# Patient Record
Sex: FEMALE | Race: WHITE | Employment: FULL TIME | URBAN - METROPOLITAN AREA
[De-identification: names, ages, dates, MRNs, and addresses within clinical notes are randomized per-mention and may not be internally consistent; named-entity substitution may affect disease eponyms.]

---

## 2019-09-12 ENCOUNTER — HOSPITAL ENCOUNTER (OUTPATIENT)
Age: 49
Setting detail: SPECIMEN
Discharge: HOME | End: 2019-09-12

## 2019-09-12 LAB
ABSOLUTE EOS #: 0.07 K/UL (ref 0–0.44)
ABSOLUTE IMMATURE GRANULOCYTE: <0.03 K/UL (ref 0–0.3)
ABSOLUTE LYMPH #: 1.43 K/UL (ref 1.1–3.7)
ABSOLUTE MONO #: 0.37 K/UL (ref 0.1–1.2)
ALBUMIN SERPL-MCNC: 4.1 G/DL (ref 3.5–5.2)
ALBUMIN/GLOBULIN RATIO: 1.6 (ref 1–2.5)
ALP BLD-CCNC: 31 U/L (ref 35–104)
ALT SERPL-CCNC: 28 U/L (ref 5–33)
ANION GAP SERPL CALCULATED.3IONS-SCNC: 12 MMOL/L (ref 9–17)
AST SERPL-CCNC: 22 U/L
BASOPHILS # BLD: 1 % (ref 0–2)
BASOPHILS ABSOLUTE: 0.04 K/UL (ref 0–0.2)
BILIRUB SERPL-MCNC: 0.37 MG/DL (ref 0.3–1.2)
BUN BLDV-MCNC: 14 MG/DL (ref 6–20)
BUN/CREAT BLD: ABNORMAL (ref 9–20)
CALCIUM SERPL-MCNC: 9.1 MG/DL (ref 8.6–10.4)
CHLORIDE BLD-SCNC: 106 MMOL/L (ref 98–107)
CHOLESTEROL/HDL RATIO: 3.3
CHOLESTEROL: 154 MG/DL
CO2: 24 MMOL/L (ref 20–31)
CREAT SERPL-MCNC: 0.48 MG/DL (ref 0.5–0.9)
DIFFERENTIAL TYPE: NORMAL
EOSINOPHILS RELATIVE PERCENT: 2 % (ref 1–4)
GFR AFRICAN AMERICAN: >60 ML/MIN
GFR NON-AFRICAN AMERICAN: >60 ML/MIN
GFR SERPL CREATININE-BSD FRML MDRD: ABNORMAL ML/MIN/{1.73_M2}
GFR SERPL CREATININE-BSD FRML MDRD: ABNORMAL ML/MIN/{1.73_M2}
GLUCOSE BLD-MCNC: 95 MG/DL (ref 70–99)
HCT VFR BLD CALC: 40.8 % (ref 36.3–47.1)
HDLC SERPL-MCNC: 46 MG/DL
HEMOGLOBIN: 12.7 G/DL (ref 11.9–15.1)
IMMATURE GRANULOCYTES: 0 %
LDL CHOLESTEROL: 92 MG/DL (ref 0–130)
LYMPHOCYTES # BLD: 36 % (ref 24–43)
MCH RBC QN AUTO: 28.9 PG (ref 25.2–33.5)
MCHC RBC AUTO-ENTMCNC: 31.1 G/DL (ref 28.4–34.8)
MCV RBC AUTO: 92.7 FL (ref 82.6–102.9)
MONOCYTES # BLD: 9 % (ref 3–12)
NRBC AUTOMATED: 0 PER 100 WBC
PDW BLD-RTO: 12.3 % (ref 11.8–14.4)
PLATELET # BLD: 270 K/UL (ref 138–453)
PLATELET ESTIMATE: NORMAL
PMV BLD AUTO: 11.3 FL (ref 8.1–13.5)
POTASSIUM SERPL-SCNC: 4.8 MMOL/L (ref 3.7–5.3)
RBC # BLD: 4.4 M/UL (ref 3.95–5.11)
RBC # BLD: NORMAL 10*6/UL
SEG NEUTROPHILS: 52 % (ref 36–65)
SEGMENTED NEUTROPHILS ABSOLUTE COUNT: 2.09 K/UL (ref 1.5–8.1)
SODIUM BLD-SCNC: 142 MMOL/L (ref 135–144)
THYROXINE, FREE: 1.81 NG/DL (ref 0.93–1.7)
TOTAL PROTEIN: 6.6 G/DL (ref 6.4–8.3)
TRIGL SERPL-MCNC: 78 MG/DL
TSH SERPL DL<=0.05 MIU/L-ACNC: 0.07 MIU/L (ref 0.3–5)
VLDLC SERPL CALC-MCNC: NORMAL MG/DL (ref 1–30)
WBC # BLD: 4 K/UL (ref 3.5–11.3)
WBC # BLD: NORMAL 10*3/UL

## 2020-04-14 ENCOUNTER — HOSPITAL ENCOUNTER (OUTPATIENT)
Age: 50
Setting detail: SPECIMEN
Discharge: HOME | End: 2020-04-14
Payer: MEDICAID

## 2020-04-14 LAB
ABSOLUTE EOS #: 0.14 K/UL (ref 0–0.44)
ABSOLUTE IMMATURE GRANULOCYTE: <0.03 K/UL (ref 0–0.3)
ABSOLUTE LYMPH #: 1.64 K/UL (ref 1.1–3.7)
ABSOLUTE MONO #: 0.41 K/UL (ref 0.1–1.2)
BASOPHILS # BLD: 1 % (ref 0–2)
BASOPHILS ABSOLUTE: 0.05 K/UL (ref 0–0.2)
DIFFERENTIAL TYPE: ABNORMAL
EOSINOPHILS RELATIVE PERCENT: 3 % (ref 1–4)
FERRITIN: 26 UG/L (ref 13–150)
HCT VFR BLD CALC: 42.6 % (ref 36.3–47.1)
HEMOGLOBIN: 13.3 G/DL (ref 11.9–15.1)
IMMATURE GRANULOCYTES: 0 %
IRON SATURATION: 19 % (ref 20–55)
IRON: 73 UG/DL (ref 37–145)
LYMPHOCYTES # BLD: 36 % (ref 24–43)
MCH RBC QN AUTO: 28.7 PG (ref 25.2–33.5)
MCHC RBC AUTO-ENTMCNC: 31.2 G/DL (ref 28.4–34.8)
MCV RBC AUTO: 92 FL (ref 82.6–102.9)
MONOCYTES # BLD: 9 % (ref 3–12)
NRBC AUTOMATED: 0 PER 100 WBC
PDW BLD-RTO: 14.8 % (ref 11.8–14.4)
PLATELET # BLD: 278 K/UL (ref 138–453)
PLATELET ESTIMATE: ABNORMAL
PMV BLD AUTO: 10.2 FL (ref 8.1–13.5)
RBC # BLD: 4.63 M/UL (ref 3.95–5.11)
RBC # BLD: ABNORMAL 10*6/UL
SEG NEUTROPHILS: 51 % (ref 36–65)
SEGMENTED NEUTROPHILS ABSOLUTE COUNT: 2.33 K/UL (ref 1.5–8.1)
TOTAL IRON BINDING CAPACITY: 394 UG/DL (ref 250–450)
UNSATURATED IRON BINDING CAPACITY: 321 UG/DL (ref 112–347)
WBC # BLD: 4.6 K/UL (ref 3.5–11.3)
WBC # BLD: ABNORMAL 10*3/UL

## 2021-01-27 ENCOUNTER — HOSPITAL ENCOUNTER (OUTPATIENT)
Age: 51
Setting detail: SPECIMEN
Discharge: HOME | End: 2021-01-27
Payer: COMMERCIAL

## 2021-01-27 LAB
ABSOLUTE EOS #: 0.09 K/UL (ref 0–0.44)
ABSOLUTE IMMATURE GRANULOCYTE: <0.03 K/UL (ref 0–0.3)
ABSOLUTE LYMPH #: 1.69 K/UL (ref 1.1–3.7)
ABSOLUTE MONO #: 0.44 K/UL (ref 0.1–1.2)
ALBUMIN SERPL-MCNC: 4.1 G/DL (ref 3.5–5.2)
ALBUMIN/GLOBULIN RATIO: 1.3 (ref 1–2.5)
ALP BLD-CCNC: 49 U/L (ref 35–104)
ALT SERPL-CCNC: 14 U/L (ref 5–33)
ANION GAP SERPL CALCULATED.3IONS-SCNC: 12 MMOL/L (ref 9–17)
AST SERPL-CCNC: 19 U/L
BASOPHILS # BLD: 1 % (ref 0–2)
BASOPHILS ABSOLUTE: 0.06 K/UL (ref 0–0.2)
BILIRUB SERPL-MCNC: 0.6 MG/DL (ref 0.3–1.2)
BUN BLDV-MCNC: 14 MG/DL (ref 6–20)
BUN/CREAT BLD: ABNORMAL (ref 9–20)
CALCIUM SERPL-MCNC: 8.5 MG/DL (ref 8.6–10.4)
CHLORIDE BLD-SCNC: 103 MMOL/L (ref 98–107)
CHOLESTEROL/HDL RATIO: 2.5
CHOLESTEROL: 229 MG/DL
CO2: 24 MMOL/L (ref 20–31)
CREAT SERPL-MCNC: 0.61 MG/DL (ref 0.5–0.9)
DIFFERENTIAL TYPE: NORMAL
EOSINOPHILS RELATIVE PERCENT: 2 % (ref 1–4)
GFR AFRICAN AMERICAN: >60 ML/MIN
GFR NON-AFRICAN AMERICAN: >60 ML/MIN
GFR SERPL CREATININE-BSD FRML MDRD: ABNORMAL ML/MIN/{1.73_M2}
GFR SERPL CREATININE-BSD FRML MDRD: ABNORMAL ML/MIN/{1.73_M2}
GLUCOSE BLD-MCNC: 85 MG/DL (ref 70–99)
HCT VFR BLD CALC: 39.1 % (ref 36.3–47.1)
HDLC SERPL-MCNC: 90 MG/DL
HEMOGLOBIN: 12.6 G/DL (ref 11.9–15.1)
IMMATURE GRANULOCYTES: 0 %
LDL CHOLESTEROL: 98 MG/DL (ref 0–130)
LYMPHOCYTES # BLD: 31 % (ref 24–43)
MCH RBC QN AUTO: 30.8 PG (ref 25.2–33.5)
MCHC RBC AUTO-ENTMCNC: 32.2 G/DL (ref 28.4–34.8)
MCV RBC AUTO: 95.6 FL (ref 82.6–102.9)
MONOCYTES # BLD: 8 % (ref 3–12)
NRBC AUTOMATED: 0 PER 100 WBC
PDW BLD-RTO: 13.1 % (ref 11.8–14.4)
PLATELET # BLD: 328 K/UL (ref 138–453)
PLATELET ESTIMATE: NORMAL
PMV BLD AUTO: 10.5 FL (ref 8.1–13.5)
POTASSIUM SERPL-SCNC: 4.5 MMOL/L (ref 3.7–5.3)
RBC # BLD: 4.09 M/UL (ref 3.95–5.11)
RBC # BLD: NORMAL 10*6/UL
SEG NEUTROPHILS: 58 % (ref 36–65)
SEGMENTED NEUTROPHILS ABSOLUTE COUNT: 3.16 K/UL (ref 1.5–8.1)
SODIUM BLD-SCNC: 139 MMOL/L (ref 135–144)
THYROXINE, FREE: 1.16 NG/DL (ref 0.93–1.7)
TOTAL PROTEIN: 7.2 G/DL (ref 6.4–8.3)
TRIGL SERPL-MCNC: 206 MG/DL
TSH SERPL DL<=0.05 MIU/L-ACNC: 17.47 MIU/L (ref 0.3–5)
VLDLC SERPL CALC-MCNC: ABNORMAL MG/DL (ref 1–30)
WBC # BLD: 5.5 K/UL (ref 3.5–11.3)
WBC # BLD: NORMAL 10*3/UL

## 2021-03-15 ENCOUNTER — HOSPITAL ENCOUNTER (OUTPATIENT)
Age: 51
Setting detail: SPECIMEN
Discharge: HOME | End: 2021-03-15
Payer: COMMERCIAL

## 2021-03-15 LAB — TSH SERPL DL<=0.05 MIU/L-ACNC: 0.38 MIU/L (ref 0.3–5)

## 2021-07-30 ENCOUNTER — HOSPITAL ENCOUNTER (OUTPATIENT)
Dept: OCCUPATIONAL THERAPY | Age: 51
Setting detail: THERAPIES SERIES
Discharge: HOME | End: 2021-07-30
Payer: COMMERCIAL

## 2021-07-30 PROCEDURE — 97165 OT EVAL LOW COMPLEX 30 MIN: CPT

## 2021-07-30 PROCEDURE — 97110 THERAPEUTIC EXERCISES: CPT

## 2021-07-30 NOTE — PROGRESS NOTES
** PLEASE SIGN, DATE AND TIME CERTIFICATION BELOW AND RETURN TO [facility] OUTPATIENT REHABILITATION (FAX #: [phone]). ATTEST/CO-SIGN IF ACCESSING VIA [employer]. THANK YOU.**    I certify that I have examined the patient below and determined that Physical Medicine and Rehabilitation service is necessary and that I approve the established plan of care for up to 90 days or as specifically noted. Attestation, signature or co-signature of physician indicates approval of certification requirements.    ________________________ ____________ __________  Physician Signature   Date   Time   [address] THER[address]  [x] EVALUATION  [] DAILY NOTE (LAND) [] DAILY NOTE (AQUATIC ) [] PROGRESS NOTE [] DISCHARGE NOTE    [x] [address] [facility]   [] [facility]    [] [facility]   [] Suzanne Boston    Date: 2021  Patient Name:  Eva Selby  : 1970  MRN: [id number]  CSN: [id number]    Referring Practitioner Christopher Salinas MD   Diagnosis RIGHT SHOULDER PAIN     Treatment Diagnosis Impingement syndrome R shoulder   Date of Evaluation 21      Functional Outcome Measure Used Upper Extremity Functional Scale   Functional Outcome Score 36/80 (21)       Insurance: Primary: Payor: [address] [place] [address] [address] /  /  / ,   Secondary:    Authorization Information: PRECERTIFICATION REQUIRED: No  INSURANCE THERAPY BENEFIT: No pre-certification is needed. PT, OT and ST are allowed 30 visits each per calendar year. AQUATIC THERAPY COVERED:  Yes   MODALITIES COVERED:  Yes--Iontophoresis is not covered. Hot/Cold Packs are not covered. TELEHEALTH COVERED:  Yes   Visit # 1, 1/10 for progress note   Visits Allowed: 30   Recertification Date:    Physician Follow-Up:    Physician Orders: 1-2x/wk x 4 weeks focusing on a HEP   Pertinent History: Patient started having pain in , perhaps end of .   Unknown cause of onset of pain. Patient had X-rays done, referred for therapy. Patient is also scheduled for an EMG early next month for suspected carpal tunnel. SUBJECTIVE: Patient pleasant and cooperative. Social/Functional History:  Medications and Allergies have been reviewed and are listed on the Medical History Questionnaire      Task Prior Level of Function  (current level of function addressed below)   ADLs  Independent   Ambulation Independent   Transfers Independent   [employer], amusement loera, going to the [employer]   Work [employer]. Occupation: owns a restaurant     OBJECTIVE:  Hand Dominance right handed   Palpation Tender to palpation R upper trap   Observation R scapula depressed and abducted, forward shoulder   Posture See above   Edema    Special Tests        ADL's Patient reports difficulty with lifting, grooming, putting on a bra       Sensation Right Upper Extremity: Diminished. Patient reports numbness and tingling in median n. Distr. Of R hand. Is wearing wrist splint at night which helps. Coordination WFL            RIGHT UPPER EXTREMITY  RANGE OF MOTION    AROM PROM COMMENTS         Shoulder Flexion 85 150    Shoulder Extension      Shoulder Abduction 65     Shoulder Adduction      Shoulder External Rotation 20 70     Shoulder Internal Rotation Thumb tip reaches waistline. 65    Shoulder Range of Motion is [facility] [place]  []      Elbow Flexion      Elbow Extension      Forearm Pronation      Forearm Supination      Elbow Range of Motion is [facility] [facility] [place]  [x]      Wrist Flexion      Wrist Extension      Wrist Radial Deviation      Wrist Ulnar Deviation      Wrist Range of Motion is [facility]ON[facility] [facility] [place]  [x]   If no measurement is recorded, no formal assessment was completed for that motion.        RIGHT UPPER EXTREMITY  STRENGTH    Strength Rating Comments   Shoulder Flexion 2+/5    Shoulder Extension     Shoulder Abduction 2+/5    Shoulder Adduction     Shoulder External Rotation 2+/5    Shoulder Internal overhead to be able to put up her hair. * Patient will improve AROM R shoulder flexion to 120, abduction to 120, ER to 50, IR thumb tip to 2\" above waistline for improved ease with dressing and grooming. * Patient will report pain no more than 1/10 at the end of her work shift. * Patient will improve R shoulder flexion, abduction, ER strength to 3+/5 to be able to stir pots at work. Long Term Goals:  Time Frame: 8 weeks  *  Patient will improve UEFS to at least 50. * Patient will demonstrate ability to reach overhead for an object with affected extremity without increased pain. Patient Education:   [x]  HEP/Education Completed: Plan of Care, Goals, HEP   [employer] Access Code for HEP: Access Code: [email or web address]   Sleeper Stretch - 3 x daily - 7 x weekly - 1 sets - 3-5 reps - 30 hold   Standing shoulder flexion wall slides - 2 x daily - 7 x weekly - 1 sets - 10 reps - 10 hold   Corner Pec Minor Stretch - 2 x daily - 7 x weekly - 1 sets - 3-5 reps - 30 hold   Standing Shoulder Internal Rotation Stretch with Hands Behind Back - 2 x daily - 7 x weekly - 1 sets - 3-5 reps - 30 hold   Gentle Levator Scapulae Stretch - 1 x daily - 7 x weekly - 3 sets - 10 reps   Gentle Levator Scapulae Stretch - 2 x daily - 7 x weekly - 1 sets - 3-5 reps - 30 hold  []  No new Education completed  []  Reviewed Prior HEP      [x]  Patient verbalized and/or demonstrated understanding of education provided. []  Patient unable to verbalize and/or demonstrate understanding of education provided. Will continue education. [x]  Barriers to learning: none    PLAN:  Treatment Recommendations: Strengthening, Range of Motion, Manual Therapy - Soft Tissue Mobilization, Manual Therapy - Joint Manipulation, Pain Management, Home Exercise Program and Patient Education    [x]  Plan of care initiated. Plan to see patient 2 times per week for 8 weeks to address the treatment planned outlined above.   []  Continue with current plan of care  [] Modify plan of care as follows:    []  Hold pending physician visit  []  Discharge    Time In 0800   Time Out 0900   Timed Code Minutes: 15 min   Total Treatment Time: 60 min       Electronically Signed by:  Jay MASSEY, T #[id number]

## 2021-08-06 ENCOUNTER — HOSPITAL ENCOUNTER (OUTPATIENT)
Dept: OCCUPATIONAL THERAPY | Age: 51
Setting detail: THERAPIES SERIES
Discharge: HOME | End: 2021-08-06
Payer: COMMERCIAL

## 2021-08-06 PROCEDURE — 97140 MANUAL THERAPY 1/> REGIONS: CPT

## 2021-08-06 PROCEDURE — 97110 THERAPEUTIC EXERCISES: CPT

## 2021-08-06 NOTE — PROGRESS NOTES
** PLEASE SIGN, DATE AND TIME CERTIFICATION BELOW AND RETURN TO [facility] OUTPATIENT REHABILITATION (FAX #: [phone]). ATTEST/CO-SIGN IF ACCESSING VIA [employer]. THANK YOU.**    I certify that I have examined the patient below and determined that Physical Medicine and Rehabilitation service is necessary and that I approve the established plan of care for up to 90 days or as specifically noted. Attestation, signature or co-signature of physician indicates approval of certification requirements.    ________________________ ____________ __________  Physician Signature   Date   Time   [address]  [] EVALUATION  [x] DAILY NOTE (LAND) [] DAILY NOTE (AQUATIC ) [] PROGRESS NOTE [] DISCHARGE NOTE    [x] [address] [facility]   [] [place] [address]    [] [address] [facility]   [] Mar Hy    Date: 2021  Patient Name:  Iris Vyas  : 1970  MRN: [id number]  CSN: [id number]    Referring Practitioner Kateri Gitelman, MD   Diagnosis RIGHT SHOULDER PAIN     Treatment Diagnosis Impingement syndrome R shoulder   Date of Evaluation 21      Functional Outcome Measure Used Upper Extremity Functional Scale   Functional Outcome Score 36/80 (21)       Insurance: Primary: Payor: [address] [place] [address] [address] /  /  / ,   Secondary:    Authorization Information: PRECERTIFICATION REQUIRED: No  INSURANCE THERAPY BENEFIT: No pre-certification is needed. PT, OT and ST are allowed 30 visits each per calendar year. AQUATIC THERAPY COVERED:  Yes   MODALITIES COVERED:  Yes--Iontophoresis is not covered. Hot/Cold Packs are not covered. TELEHEALTH COVERED:  Yes   Visit # 2, 2/10 for progress note   Visits Allowed: 30   Recertification Date:    Physician Follow-Up:    Physician Orders: 1-2x/wk x 4 weeks focusing on a HEP   Pertinent History: Patient started having pain in , perhaps end of .   Unknown cause of Patient will improve AROM R shoulder flexion to 120, abduction to 120, ER to 50, IR thumb tip to 2\" above waistline for improved ease with dressing and grooming. * Patient will report pain no more than 1/10 at the end of her work shift. * Patient will improve R shoulder flexion, abduction, ER strength to 3+/5 to be able to stir pots at work. Long Term Goals:  Time Frame: 8 weeks  *  Patient will improve UEFS to at least 50. * Patient will demonstrate ability to reach overhead for an object with affected extremity without increased pain. Patient Education:   [x]  HEP/Education Completed: Plan of Care, Goals, HEP   [address] Access Code for HEP: Access Code: [email or web address]   Sleeper Stretch - 3 x daily - 7 x weekly - 1 sets - 3-5 reps - 30 hold   Standing shoulder flexion wall slides - 2 x daily - 7 x weekly - 1 sets - 10 reps - 10 hold   Corner Pec Minor Stretch - 2 x daily - 7 x weekly - 1 sets - 3-5 reps - 30 hold   Standing Shoulder Internal Rotation Stretch with Hands Behind Back - 2 x daily - 7 x weekly - 1 sets - 3-5 reps - 30 hold   Gentle Levator Scapulae Stretch - 1 x daily - 7 x weekly - 3 sets - 10 reps   Shoulder External Rotation with Anchored Resistance - 1 x daily - 7 x weekly - 10 reps - 3 sets - NEW TODAY   Shoulder Extension with Resistance - 1 x daily - 7 x weekly - 10 reps - 3 sets - NEW TODAY   Shoulder Internal Rotation with Resistance - 1 x daily - 7 x weekly - 10 reps - 3 sets - NEW TODAY   Standing Single Arm Shoulder Flexion with Posterior Anchored Resistance - 1 x daily - 7 x weekly - 10 reps - 3 sets - NEW TODAY   Supine Scapular Protraction in Flexion with Dumbbells - 1 x daily - 7 x weekly - 3 sets - 10 reps - NEW TODAY  []  No new Education completed  []  Reviewed Prior HEP      [x]  Patient verbalized and/or demonstrated understanding of education provided. []  Patient unable to verbalize and/or demonstrate understanding of education provided.   Will continue education. [x]  Barriers to learning: none    PLAN:  Treatment Recommendations: Strengthening, Range of Motion, Manual Therapy - Soft Tissue Mobilization, Manual Therapy - Joint Manipulation, Pain Management, Home Exercise Program and Patient Education    []  Plan of care initiated. Plan to see patient 2 times per week for 8 weeks to address the treatment planned outlined above.   [x]  Continue with current plan of care  []  Modify plan of care as follows:    []  Hold pending physician visit  []  Discharge    Time In 0900   Time Out 0938   Timed Code Minutes: 38 min   Total Treatment Time: 38 min       Electronically Signed by:  Efrain MASSEY, CHT #[id number]

## 2021-08-09 ENCOUNTER — HOSPITAL ENCOUNTER (OUTPATIENT)
Dept: OCCUPATIONAL THERAPY | Age: 51
Setting detail: THERAPIES SERIES
Discharge: HOME | End: 2021-08-09
Payer: COMMERCIAL

## 2021-08-09 PROCEDURE — 97110 THERAPEUTIC EXERCISES: CPT

## 2021-08-09 NOTE — PROGRESS NOTES
[address]  [] EVALUATION  [x] DAILY NOTE (LAND) [] DAILY NOTE (AQUATIC ) [] PROGRESS NOTE [] DISCHARGE NOTE    [x] OUTPATIENT REHABILITATION CENTER [facility]   [] [place] [address]    [] [facility]   [] Noah Linear    Date: 2021  Patient Name:  Pee Demarco  : 1970  MRN: [id number]  CSN: [id number]    Referring Practitioner Wero Farris MD   Diagnosis RIGHT SHOULDER PAIN     Treatment Diagnosis Impingement syndrome R shoulder   Date of Evaluation 21      Functional Outcome Measure Used Upper Extremity Functional Scale   Functional Outcome Score 36/80 (21)       Insurance: Primary: Payor: [address] [place] [address]  [address] /  /  / ,   Secondary:    Authorization Information: PRECERTIFICATION REQUIRED: No  INSURANCE THERAPY BENEFIT: No pre-certification is needed. PT, OT and ST are allowed 30 visits each per calendar year. AQUATIC THERAPY COVERED:  Yes   MODALITIES COVERED:  Yes--Iontophoresis is not covered. Hot/Cold Packs are not covered. TELEHEALTH COVERED:  Yes   Visit # 3, 310 for progress note   Visits Allowed: 30   Recertification Date:    Physician Follow-Up:    Physician Orders: 1-2x/wk x 4 weeks focusing on a HEP   Pertinent History: Patient started having pain in , perhaps end of . Unknown cause of onset of pain. Patient had X-rays done, referred for therapy. Patient is also scheduled for an EMG early next month for suspected carpal tunnel. SUBJECTIVE: Patient reports she thought the kinesiotaping helped a bit. Doing the exercises as much as she can.     OBJECTIVE:    TREATMENT   Precautions: General precautions    Pain: 1/10 R upper trap     X in shaded column indicates Activity Completed Today   Modalities Parameters/  Location  Notes/Comments                     Manual Therapy Time/  Technique  Notes/Comments   IASTM  X Completed to R shoulder especially to tight areas at upper trap and around scapula as well as pec minor               Exercises   Sets/  Sec Reps  Notes/Comments   Sleeper stretch 30 sec 2 X    Wall slides 10 sec 5 X    pec minor stretch in corner 30 sec 2 X    IR stretch  30 sec 2 X Added levator stretch into exercise   Theraband IR/ER/flex/ext 1 10 ea X IR/ER at 0 with towel roll, flexion kept below 90. Supine scapular punch 1#, circles CW, CCW 1 15 X Good tolerance    Peach theraband for rows with elbow at 90 degrees and straight 1 10 x                         Activities Time    Notes/Comments   kinesiotaping for pain control   Deltoid and supraspinatus inhibition, mechanical correction anterior to posterior shoulder anchoring at inferior angle of scapula                 Specific Interventions Next Treatment: STM, ROM, scapular/RC strengthening    Activity/Treatment Tolerance:  [x]  Patient tolerated treatment well  []  Patient limited by fatigue  []  Patient limited by pain   []  Patient limited by other medical complications  []  Other:     Assessment: Progressing toward goals. AROM is improved this date, tolerated light strengthening well. Areas for Improvement: impaired activity tolerance, impaired ROM, impaired strength and pain  Prognosis: good    GOALS:  Patient Goal: Reduce pain with activity. Short Term Goals:  Time Frame: 4 weeks  *  Patient will demonstrate I knowledge of HEP for improved flexibliity with reaching overhead to be able to put up her hair. * Patient will improve AROM R shoulder flexion to 120, abduction to 120, ER to 50, IR thumb tip to 2\" above waistline for improved ease with dressing and grooming. * Patient will report pain no more than 1/10 at the end of her work shift. * Patient will improve R shoulder flexion, abduction, ER strength to 3+/5 to be able to stir pots at work. Long Term Goals:  Time Frame: 8 weeks  *  Patient will improve UEFS to at least 50.   * Patient will demonstrate ability to reach overhead for an object with affected extremity without increased pain. Patient Education:   [x]  HEP/Education Completed: Plan of Care, Goals, HEP   [address] Access Code for HEP: Access Code: [email or web address]   Sleeper Stretch - 3 x daily - 7 x weekly - 1 sets - 3-5 reps - 30 hold   Standing shoulder flexion wall slides - 2 x daily - 7 x weekly - 1 sets - 10 reps - 10 hold   Corner Pec Minor Stretch - 2 x daily - 7 x weekly - 1 sets - 3-5 reps - 30 hold   Standing Shoulder Internal Rotation Stretch with Hands Behind Back - 2 x daily - 7 x weekly - 1 sets - 3-5 reps - 30 hold   Gentle Levator Scapulae Stretch - 1 x daily - 7 x weekly - 3 sets - 10 reps   Shoulder External Rotation with Anchored Resistance - 1 x daily - 7 x weekly - 10 reps - 3 sets - NEW TODAY   Shoulder Extension with Resistance - 1 x daily - 7 x weekly - 10 reps - 3 sets - NEW TODAY   Shoulder Internal Rotation with Resistance - 1 x daily - 7 x weekly - 10 reps - 3 sets - NEW TODAY   Standing Single Arm Shoulder Flexion with Posterior Anchored Resistance - 1 x daily - 7 x weekly - 10 reps - 3 sets - NEW TODAY   Supine Scapular Protraction in Flexion with Dumbbells - 1 x daily - 7 x weekly - 3 sets - 10 reps - NEW TODAY  []  No new Education completed  []  Reviewed Prior HEP      [x]  Patient verbalized and/or demonstrated understanding of education provided. []  Patient unable to verbalize and/or demonstrate understanding of education provided. Will continue education. [x]  Barriers to learning: none    PLAN:  Treatment Recommendations: Strengthening, Range of Motion, Manual Therapy - Soft Tissue Mobilization, Manual Therapy - Joint Manipulation, Pain Management, Home Exercise Program and Patient Education    []  Plan of care initiated. Plan to see patient 2 times per week for 8 weeks to address the treatment planned outlined above.   [x]  Continue with current plan of care  []  Modify plan of care as follows:    []  Hold pending [id number] visit  [] Discharge    Time In 1330   Time Out 1410   Timed Code Minutes: 40 min   Total Treatment Time: 40 min       Electronically Signed by:  MARY Hair OTR/L [id number]

## 2021-08-12 ENCOUNTER — PROCEDURE VISIT (OUTPATIENT)
Dept: NEUROLOGY | Age: 51
End: 2021-08-12
Payer: COMMERCIAL

## 2021-08-12 DIAGNOSIS — R20.0 BILATERAL HAND NUMBNESS: ICD-10-CM

## 2021-08-12 DIAGNOSIS — G56.03 BILATERAL CARPAL TUNNEL SYNDROME: Primary | ICD-10-CM

## 2021-08-12 PROCEDURE — 95886 MUSC TEST DONE W/N TEST COMP: CPT | Performed by: PSYCHIATRY & NEUROLOGY

## 2021-08-12 PROCEDURE — 95911 NRV CNDJ TEST 9-10 STUDIES: CPT | Performed by: PSYCHIATRY & NEUROLOGY

## 2021-08-20 ENCOUNTER — HOSPITAL ENCOUNTER (OUTPATIENT)
Dept: OCCUPATIONAL THERAPY | Age: 51
Setting detail: THERAPIES SERIES
Discharge: HOME | End: 2021-08-20
Payer: COMMERCIAL

## 2021-08-20 PROCEDURE — 97110 THERAPEUTIC EXERCISES: CPT

## 2021-08-20 PROCEDURE — 97140 MANUAL THERAPY 1/> REGIONS: CPT

## 2021-08-20 NOTE — PROGRESS NOTES
[address]  [] EVALUATION  [x] DAILY NOTE (LAND) [] DAILY NOTE (AQUATIC ) [] PROGRESS NOTE [] DISCHARGE NOTE    [x] OUTPATIENT REHABILITATION CENTER [facility]   [] [place] [address]    [] [facility]   [facility]    Date: 2021  Patient Name:  John Mullen  : 1970  MRN: [id number]  CSN: [id number]    Referring Practitioner Wisam Christianson MD   Diagnosis RIGHT SHOULDER PAIN     Treatment Diagnosis Impingement syndrome R shoulder   Date of Evaluation 21      Functional Outcome Measure Used Upper Extremity Functional Scale   Functional Outcome Score 36/80 (21)       Insurance: Primary: Payor: [address] [place] [address]  [address] /  /  / ,   Secondary:    Authorization Information: PRECERTIFICATION REQUIRED: No  INSURANCE THERAPY BENEFIT: No pre-certification is needed. PT, OT and ST are allowed 30 visits each per calendar year. AQUATIC THERAPY COVERED:  Yes   MODALITIES COVERED:  Yes--Iontophoresis is not covered. Hot/Cold Packs are not covered. TELEHEALTH COVERED:  Yes   Visit # 4, 4/10 for progress note   Visits Allowed: 30   Recertification Date:    Physician Follow-Up:    Physician Orders: 1-2x/wk x 4 weeks focusing on a HEP   Pertinent History: Patient started having pain in , perhaps end of . Unknown cause of onset of pain. Patient had X-rays done, referred for therapy. Patient is also scheduled for an EMG early next month for suspected carpal tunnel. SUBJECTIVE: Patient demonstrated that she has been able to [place] her hair.     OBJECTIVE:    TREATMENT   Precautions: General precautions    Pain: 1/10 R upper trap     X in shaded column indicates Activity Completed Today   Modalities Parameters/  Location  Notes/Comments                     Manual Therapy Time/  Technique  Notes/Comments   IASTM  X Completed to R shoulder especially to tight areas at upper trap and around scapula as well as pec minor               Exercises   Sets/  Sec Reps  Notes/Comments   Sleeper stretch 30 sec 2 X    Wall slides 10 sec 5 X    pec minor stretch in corner 30 sec 2 X    IR stretch  30 sec 2 X Added levator stretch into exercise   Theraband IR/ER/flex/ext 1 10 ea  IR/ER at 0 with towel roll, flexion kept below 90. Supine scapular punch 1#, circles CW, CCW 1 15  Good tolerance    Peach theraband for rows with elbow at 90 degrees and straight 1 10     Supine pec minor stretch/thoracic stretch on towel roll   X \"feels good\"                 Activities Time    Notes/Comments   kinesiotaping for pain control   Deltoid and supraspinatus inhibition, mechanical correction anterior to posterior shoulder anchoring at inferior angle of scapula                 Specific Interventions Next Treatment: STM, ROM, scapular/RC strengthening    Activity/Treatment Tolerance:  [x]  Patient tolerated treatment well  []  Patient limited by fatigue  []  Patient limited by pain   []  Patient limited by other medical complications  []  Other:     Assessment: Progressing toward goals. Demonstrating full PROM, AROM is WFL. Strengthening HEP is being tolerated well. Having some upper trap tightness and reviewed stretches and techniques to improve that. Areas for Improvement: impaired activity tolerance, impaired ROM, impaired strength and pain  Prognosis: good    GOALS:  Patient Goal: Reduce pain with activity. Short Term Goals:  Time Frame: 4 weeks  *  Patient will demonstrate I knowledge of HEP for improved flexibliity with reaching overhead to be able to put up her hair. * Patient will improve AROM R shoulder flexion to 120, abduction to 120, ER to 50, IR thumb tip to 2\" above waistline for improved ease with dressing and grooming. * Patient will report pain no more than 1/10 at the end of her work shift. * Patient will improve R shoulder flexion, abduction, ER strength to 3+/5 to be able to stir pots at work.     Long Term above.  [x]  Continue with current plan of care  []  Modify plan of care as follows:    []  Hold pending [id number] visit  []  Discharge    Time In 0845   Time Out 0925   Timed Code Minutes: 40 min   Total Treatment Time: 40 min       Electronically Signed by:  Adeline CASTRO/VY, CHT #[id number]

## 2021-08-24 ENCOUNTER — HOSPITAL ENCOUNTER (OUTPATIENT)
Dept: OCCUPATIONAL THERAPY | Age: 51
Setting detail: THERAPIES SERIES
Discharge: HOME | End: 2021-08-24
Payer: COMMERCIAL

## 2021-08-24 PROCEDURE — 97140 MANUAL THERAPY 1/> REGIONS: CPT

## 2021-08-24 PROCEDURE — 97110 THERAPEUTIC EXERCISES: CPT

## 2021-08-24 NOTE — DISCHARGE SUMMARY
[address]  [] EVALUATION  [] DAILY NOTE (LAND) [] DAILY NOTE (AQUATIC ) [] PROGRESS NOTE [x] DISCHARGE NOTE    [x] OUTPATIENT REHABILITATION CENTER [facility]   [] [place] [address]    [] [facility]   [] [facility]    Date: 2021  Patient Name:  Min Jimenez  : 1970  MRN: [id number]  CSN: [id number]    Referring Practitioner Denis Sykes MD   Diagnosis RIGHT SHOULDER PAIN     Treatment Diagnosis Impingement syndrome R shoulder   Date of Evaluation 21      Functional Outcome Measure Used Upper Extremity Functional Scale   Functional Outcome Score 36/80 (21)  48/80 (21)      Insurance: Primary: Payor: [address] [place] [address]  [address] /  /  / ,   Secondary:    Authorization Information: PRECERTIFICATION REQUIRED: No  INSURANCE THERAPY BENEFIT: No pre-certification is needed. PT, OT and ST are allowed 30 visits each per calendar year. AQUATIC THERAPY COVERED:  Yes   MODALITIES COVERED:  Yes--Iontophoresis is not covered. Hot/Cold Packs are not covered. TELEHEALTH COVERED:  Yes   Visit # 5   Visits Allowed: 30   Recertification Date: 92   Physician Follow-Up:    Physician Orders: 1-2x/wk x 4 weeks focusing on a HEP   Pertinent History: Patient started having pain in , perhaps end of . Unknown cause of onset of pain. Patient had X-rays done, referred for therapy. Patient is also scheduled for an EMG early next month for suspected carpal tunnel. SUBJECTIVE: Patient demonstrated that she has been able to [place] her hair.     OBJECTIVE:    TREATMENT   Precautions: General precautions    Pain: 1/10 R upper trap     X in shaded column indicates Activity Completed Today   Modalities Parameters/  Location  Notes/Comments                     Manual Therapy Time/  Technique  Notes/Comments   IASTM  X Completed to R shoulder especially to tight areas at upper trap and around scapula as well as pec minor               Exercises   Sets/  Sec Reps  Notes/Comments   Sleeper stretch 30 sec 2 X    Wall slides 10 sec 5 X    pec minor stretch in corner 30 sec 2 X    IR stretch  30 sec 2 X Added levator stretch into exercise   Theraband IR/ER/flex/ext 1 10 ea X Bollinger IR/ER at 0 with towel roll, flexion kept below 90. Supine scapular punch 1#, circles CW, CCW 1 15  Good tolerance    Peach theraband for rows with elbow at 90 degrees 1 20 X    Supine pec minor stretch/thoracic stretch on towel roll   X \"feels good\"   Upper trap stretch 30 sec  X           Activities Time    Notes/Comments   kinesiotaping for pain control   Deltoid and supraspinatus inhibition, mechanical correction anterior to posterior shoulder anchoring at inferior angle of scapula                 Specific Interventions Next Treatment: STM, ROM, scapular/RC strengthening    Activity/Treatment Tolerance:  [x]  Patient tolerated treatment well  []  Patient limited by fatigue  []  Patient limited by pain   []  Patient limited by other medical complications  []  Other:     Assessment: Progressing toward goals. Demonstrating full PROM, AROM is WFL. Strength is improving and she is I with HEP. Will discharge to [facility] at this time. Areas for Improvement: impaired activity tolerance, impaired ROM, impaired strength and pain  Prognosis: good    GOALS:  Patient Goal: Reduce pain with activity. Short Term Goals:  Time Frame: 4 weeks  *  Patient will demonstrate I knowledge of HEP for improved flexibliity with reaching overhead to be able to put up her hair. GOAL MET  * Patient will improve AROM R shoulder flexion to 120, abduction to 120, ER to 50, IR thumb tip to 2\" above waistline for improved ease with dressing and grooming. AROM flexion = 180, abduction = 120, ER = 60, IR thumb tip reaches bra line. GOAL MET  * Patient will report pain no more than 1/10 at the end of her work shift. Moderate pain reported.   GOAL NOT MET  * Patient will improve R shoulder flexion, abduction, ER strength to 3+/5 to be able to stir pots at work. GOAL MET    Long Term Goals:  Time Frame: 8 weeks  *  Patient will improve UEFS to at least 50. 48. GOAL NOT MET  * Patient will demonstrate ability to reach overhead for an object with affected extremity without increased pain. GOAL MET    Patient Education:   [x]  HEP/Education Completed: Plan of Care, Goals, HEP   [address] Access Code for HEP: Access Code: [email or web address]   Sleeper Stretch - 3 x daily - 7 x weekly - 1 sets - 3-5 reps - 30 hold   Standing shoulder flexion wall slides - 2 x daily - 7 x weekly - 1 sets - 10 reps - 10 hold   Corner Pec Minor Stretch - 2 x daily - 7 x weekly - 1 sets - 3-5 reps - 30 hold   Standing Shoulder Internal Rotation Stretch with Hands Behind Back - 2 x daily - 7 x weekly - 1 sets - 3-5 reps - 30 hold   Gentle Levator Scapulae Stretch - 1 x daily - 7 x weekly - 3 sets - 10 reps   Shoulder External Rotation with Anchored Resistance - 1 x daily - 7 x weekly - 10 reps - 3 sets   Shoulder Extension with Resistance - 1 x daily - 7 x weekly - 10 reps - 3 sets   Shoulder Internal Rotation with Resistance - 1 x daily - 7 x weekly - 10 reps - 3 sets   Standing Single Arm Shoulder Flexion with Posterior Anchored Resistance - 1 x daily - 7 x weekly - 10 reps - 3 sets    Supine Scapular Protraction in Flexion with Dumbbells - 1 x daily - 7 x weekly - 3 sets - 10 reps    Supine thoracic stretch on towel roll   []  No new Education completed  []  Reviewed Prior HEP      [x]  Patient verbalized and/or demonstrated understanding of education provided. []  Patient unable to verbalize and/or demonstrate understanding of education provided. Will continue education.   [x]  Barriers to learning: none    PLAN:  Treatment Recommendations: Strengthening, Range of Motion, Manual Therapy - Soft Tissue Mobilization, Manual Therapy - Joint Manipulation, Pain Management, Home Exercise Program and Patient Education    []  Plan of care initiated. Plan to see patient 2 times per week for 8 weeks to address the treatment planned outlined above.   []  Continue with current plan of care  []  Modify plan of care as follows:    []  Hold pending [id number] visit  [x]  Discharge    Time In 0900   Time Out 0940   Timed Code Minutes: 40 min   Total Treatment Time: 40 min       Electronically Signed by:  Chriss Libman OTR/VY, CHT #[id number]

## 2022-01-05 ENCOUNTER — HOSPITAL ENCOUNTER (EMERGENCY)
Age: 52
Discharge: HOME | End: 2022-01-05
Payer: COMMERCIAL

## 2022-01-05 VITALS
BODY MASS INDEX: 37.76 KG/M2 | OXYGEN SATURATION: 95 % | DIASTOLIC BLOOD PRESSURE: 91 MMHG | SYSTOLIC BLOOD PRESSURE: 137 MMHG | HEIGHT: 61 IN | RESPIRATION RATE: 18 BRPM | HEART RATE: 110 BPM | WEIGHT: 200 LBS | TEMPERATURE: 99.3 F

## 2022-01-05 DIAGNOSIS — R05.9 COUGH: Primary | ICD-10-CM

## 2022-01-05 DIAGNOSIS — J06.9 VIRAL UPPER RESPIRATORY TRACT INFECTION: ICD-10-CM

## 2022-01-05 DIAGNOSIS — R11.2 NAUSEA AND VOMITING, INTRACTABILITY OF VOMITING NOT SPECIFIED, UNSPECIFIED VOMITING TYPE: ICD-10-CM

## 2022-01-05 LAB
FLU A ANTIGEN: NEGATIVE
FLU B ANTIGEN: NEGATIVE
SARS-COV-2, NAAT: NOT DETECTED

## 2022-01-05 PROCEDURE — 87635 SARS-COV-2 COVID-19 AMP PRB: CPT

## 2022-01-05 PROCEDURE — 87804 INFLUENZA ASSAY W/OPTIC: CPT

## 2022-01-05 PROCEDURE — 99283 EMERGENCY DEPT VISIT LOW MDM: CPT

## 2022-01-05 PROCEDURE — 6370000000 HC RX 637 (ALT 250 FOR IP): Performed by: PHYSICIAN ASSISTANT

## 2022-01-05 RX ORDER — ONDANSETRON 4 MG/1
4 TABLET, ORALLY DISINTEGRATING ORAL EVERY 8 HOURS PRN
Qty: 20 TABLET | Refills: 0 | Status: SHIPPED | OUTPATIENT
Start: 2022-01-05

## 2022-01-05 RX ORDER — BENZONATATE 100 MG/1
100 CAPSULE ORAL 3 TIMES DAILY PRN
Qty: 30 CAPSULE | Refills: 0 | Status: SHIPPED | OUTPATIENT
Start: 2022-01-05 | End: 2022-01-12

## 2022-01-05 RX ORDER — ONDANSETRON 4 MG/1
4 TABLET, ORALLY DISINTEGRATING ORAL ONCE
Status: COMPLETED | OUTPATIENT
Start: 2022-01-05 | End: 2022-01-05

## 2022-01-05 RX ADMIN — ONDANSETRON 4 MG: 4 TABLET, ORALLY DISINTEGRATING ORAL at 12:29

## 2022-01-05 ASSESSMENT — PAIN DESCRIPTION - LOCATION: LOCATION: GENERALIZED

## 2022-01-05 ASSESSMENT — ENCOUNTER SYMPTOMS
ABDOMINAL PAIN: 0
CONSTIPATION: 0
BLOOD IN STOOL: 0
SHORTNESS OF BREATH: 0
EYE PAIN: 0
VOMITING: 0
WHEEZING: 0
RHINORRHEA: 0
DIARRHEA: 0
NAUSEA: 0
COUGH: 1

## 2022-01-05 ASSESSMENT — PAIN DESCRIPTION - PAIN TYPE: TYPE: ACUTE PAIN

## 2022-01-05 ASSESSMENT — PAIN SCALES - GENERAL: PAINLEVEL_OUTOF10: 8

## 2022-01-05 NOTE — ED PROVIDER NOTES
[address] [facility] [address] [address] EMERGENCY DEPT  EMERGENCY MEDICINE     Pt Name: Barbara Padron  MRN: [id number]  [place] 1970  Date of evaluation: 2022  PCP:    Alfie Gatica MD  Provider: JULIO CÉSAR [facility]       Chief Complaint   Patient presents with    Cough    Generalized Body Aches    Emesis            HISTORY OF PRESENT ILLNESS    Riky Celis is a 16-year-old female patient who presents to ER with complaint of cough, fever, generalized body aches and vomiting. She denies chest pain, shortness of breath or diarrhea. She denies taking any medication today because \"she was coming here\". Patient appears ill, but does not look toxic. Lung sounds are clear to auscultation. O2 saturation is 95% on room air. Triage notes and Nursing notes were reviewed by myself. Any discrepancies are addressed above. PAST MEDICAL HISTORY     Past Medical History:   Diagnosis Date    Diaphragm paralysis     Hyperlipidemia     Not bad enough to go on meds    Kidney stone     Thyroid disease        SURGICAL HISTORY       Past Surgical History:   Procedure Laterality Date     SECTION      2       CURRENT MEDICATIONS       Previous Medications    ACETAMINOPHEN (TYLENOL) 325 MG TABLET    Take 650 mg by mouth every 6 hours as needed for Pain. ALBUTEROL SULFATE HFA (PROVENTIL HFA) 108 (90 BASE) MCG/ACT INHALER    Inhale 2 puffs into the lungs every 6 hours as needed for Wheezing or Shortness of Breath    AZITHROMYCIN (ZITHROMAX) 250 MG TABLET    Take 2 tablets day 1, then 1 tablet daily days 2-5.     LEVOTHYROXINE SODIUM PO    Take 83 mcg by mouth    PROMETHAZINE-CODEINE (PHENERGAN WITH CODEINE) 6.25-10 MG/5ML SYRUP    Take 5 mLs by mouth nightly as needed for Cough    PSEUDOEPHEDRINE (SUDAFED) 30 MG TABLET    Take 30 mg by mouth every 4 hours as needed for Congestion    SERTRALINE (ZOLOFT) 50 MG TABLET    Take 50 mg by mouth daily       ALLERGIES     No Known Allergies    FAMILY HISTORY       Family History   Problem Relation Age of Onset    Stroke Mother         SOCIAL HISTORY       Social History     Socioeconomic History    Marital status:      Spouse name: Not on file    Number of children: Not on file    Years of education: Not on file    Highest education level: Not on file   Occupational History    Not on file   Tobacco Use    Smoking status: Former Smoker     Types: Cigarettes     Quit date: 3/25/2015     Years since quittin.7    Smokeless tobacco: Never Used   Substance and Sexual Activity    Alcohol use: Yes     Comment: occasionally    Drug use: No    Sexual activity: Yes     Partners: Male   Other Topics Concern    Not on file   Social History Narrative    Not on file     Social Determinants of Health     Financial Resource Strain:     Difficulty of Paying Living Expenses: Not on file   Food Insecurity:     Worried About Running Out of Food in the Last Year: Not on file    Tete of Food in the Last Year: Not on file   Transportation Needs:     Lack of Transportation (Medical): Not on file    Lack of Transportation (Non-Medical):  Not on file   Physical Activity:     Days of Exercise per Week: Not on file    Minutes of Exercise per Session: Not on file   Stress:     Feeling of Stress : Not on file   Social Connections:     Frequency of Communication with Friends and Family: Not on file    Frequency of Social Gatherings with Friends and Family: Not on file    Attends Gnosticism Services: Not on file    Active Member of Clubs or Organizations: Not on file    Attends Club or Organization Meetings: Not on file    Marital Status: Not on file   Intimate Partner Violence:     Fear of Current or Ex-Partner: Not on file    Emotionally Abused: Not on file    Physically Abused: Not on file    Sexually Abused: Not on file   Housing Stability:     Unable to Pay for Housing in the Last Year: Not on file    Number of [facility] in the Last Year: Not on file    Unstable Housing in the Last Year: Not on file       REVIEW OF SYSTEMS     Review of Systems   Constitutional: Positive for fatigue and fever. Negative for appetite change, chills and unexpected weight change. HENT: Positive for congestion. Negative for ear pain and rhinorrhea. Eyes: Negative for pain and visual disturbance. Respiratory: Positive for cough. Negative for shortness of breath and wheezing. Cardiovascular: Negative for chest pain, palpitations and leg swelling. Gastrointestinal: Negative for abdominal pain, blood in stool, constipation, diarrhea, nausea and vomiting. Genitourinary: Negative for dysuria, frequency and hematuria. Musculoskeletal: Negative for arthralgias, joint swelling and neck stiffness. Skin: Negative for rash. Neurological: Positive for headaches. Negative for dizziness, syncope, weakness and light-headedness. Hematological: Does not bruise/bleed easily. Except as noted above the remainder of the review of systems was reviewed and is negative. SCREENINGS                        PHYSICAL EXAM    (up to 7 for level 4, 8 or more for level 5)     ED Triage Vitals [01/05/22 1052]   BP Temp Temp Source Pulse Resp SpO2 Height Weight   (!) 149/84 99.3 °F (37.4 °C) Oral 121 18 92 % 5' 1\" (1.549 m) 200 lb (90.7 kg)       Physical Exam  Vitals and nursing note reviewed. Constitutional:       Appearance: She is well-developed. HENT:      Head: Normocephalic and atraumatic. Nose: Nose normal.      Mouth/Throat:      Mouth: Mucous membranes are moist.   Eyes:      Conjunctiva/sclera: Conjunctivae normal.      Pupils: Pupils are equal, round, and reactive to light. Cardiovascular:      Rate and Rhythm: Normal rate and regular rhythm. Heart sounds: Normal heart sounds. No murmur heard. No gallop. Pulmonary:      Effort: Pulmonary effort is normal. No respiratory distress. Breath sounds: Normal breath sounds.  No wheezing or rales. Abdominal:      General: Bowel sounds are normal.      Palpations: Abdomen is soft. Musculoskeletal:         General: Normal range of motion. Cervical back: Normal range of motion. Skin:     General: Skin is warm and dry. Capillary Refill: Capillary refill takes less than 2 seconds. Neurological:      Mental Status: She is alert and oriented to person, place, and time. DIAGNOSTIC RESULTS     EKG:(none if blank)  All EKGs are interpreted by the Emergency Department Physician who either signs or Co-signs this chart in the absence of a cardiologist.        RADIOLOGY: (none if blank)   I directly visualized the following images and reviewed the radiologist interpretations. Interpretation per the Radiologist below, if available at the time of this note:  No orders to display       LABS:  Labs Reviewed   COVID-19, RAPID   RAPID INFLUENZA A/B ANTIGENS       All other labs were within normal range or not returned as of this dictation. Please note, any cultures that may have been sent were not resulted at the time of this patient visit. EMERGENCY DEPARTMENT COURSE and Medical Decision Making:     Vitals:    Vitals:    01/05/22 1052 01/05/22 1231   BP: (!) 149/84 (!) 137/91   Pulse: 121 110   Resp: 18 18   Temp: 99.3 °F (37.4 °C)    TempSrc: Oral    SpO2: 92% 95%   Weight: 200 lb (90.7 kg)    Height: 5' 1\" (1.549 m)        PROCEDURES: (None if blank)  Procedures       MDM  Number of Diagnoses or Management Options  Cough: new, needed workup  Nausea and vomiting, intractability of vomiting not specified, unspecified vomiting type: new, needed workup  Viral upper respiratory tract infection: new, needed workup    Patient presents to ER with complaint of cough, fever, generalized body aches, vomiting and congestion. Influenza and COVID-19 testing were both reassuring.   Patient will be discharged home with instructions to take over-the-counter Tylenol or ibuprofen for fever and body aches. She was given a prescription for Zofran and Tessalon Perles. Patient to treat symptoms of viral illness. Strict return precautions and follow up instructions were discussed with the patient with which the patient agrees    ED Medications administered this visit:    Medications   ondansetron (ZOFRAN-ODT) disintegrating tablet 4 mg (4 mg Oral Given 1/5/22 6724)         FINAL IMPRESSION      1. Cough    2. Nausea and vomiting, intractability of vomiting not specified, unspecified vomiting type    3.  Viral upper respiratory tract infection          DISPOSITION/PLAN   DISPOSITION Decision To Discharge 01/05/2022 12:33:53 PM      PATIENT REFERRED TO:  Wilbert Delong MD  [address] [place], [place] [phone]            DISCHARGE MEDICATIONS:  New Prescriptions    BENZONATATE (TESSALON PERLES) 100 MG CAPSULE    Take 1 capsule by mouth 3 times daily as needed for Cough    ONDANSETRON (ZOFRAN ODT) 4 MG DISINTEGRATING TABLET    Take 1 tablet by mouth every 8 hours as needed for Nausea              JULIO CÉSAR Oshea CNP (electronically signed)           JULIO CÉSAR Oshea CNP  01/05/22 3791

## 2022-01-05 NOTE — ED NOTES
Patient presents to the ED with complaints of generalized body aches, cough and vomiting. She is taking tessalon pearls for her cough.       Maritza Avery, KATEY  09/18/11 0796

## 2022-03-04 ENCOUNTER — HOSPITAL ENCOUNTER (OUTPATIENT)
Age: 52
Setting detail: SPECIMEN
Discharge: HOME | End: 2022-03-04

## 2022-03-04 LAB
ALBUMIN SERPL-MCNC: 4.4 G/DL (ref 3.5–5.2)
ALBUMIN/GLOBULIN RATIO: 1.6 (ref 1–2.5)
ALP BLD-CCNC: 62 U/L (ref 35–104)
ALT SERPL-CCNC: 34 U/L (ref 5–33)
ANION GAP SERPL CALCULATED.3IONS-SCNC: 10 MMOL/L (ref 9–17)
AST SERPL-CCNC: 26 U/L
BILIRUB SERPL-MCNC: 0.3 MG/DL (ref 0.3–1.2)
BUN BLDV-MCNC: 12 MG/DL (ref 6–20)
CALCIUM SERPL-MCNC: 9.1 MG/DL (ref 8.6–10.4)
CHLORIDE BLD-SCNC: 106 MMOL/L (ref 98–107)
CHOLESTEROL/HDL RATIO: 3.6
CHOLESTEROL: 215 MG/DL
CO2: 27 MMOL/L (ref 20–31)
CREAT SERPL-MCNC: 0.46 MG/DL (ref 0.5–0.9)
GFR AFRICAN AMERICAN: >60 ML/MIN
GFR NON-AFRICAN AMERICAN: >60 ML/MIN
GFR SERPL CREATININE-BSD FRML MDRD: ABNORMAL ML/MIN/{1.73_M2}
GLUCOSE BLD-MCNC: 101 MG/DL (ref 70–99)
HCT VFR BLD CALC: 41.1 % (ref 36.3–47.1)
HDLC SERPL-MCNC: 60 MG/DL
HEMOGLOBIN: 12.8 G/DL (ref 11.9–15.1)
LDL CHOLESTEROL: 113 MG/DL (ref 0–130)
MCH RBC QN AUTO: 29.5 PG (ref 25.2–33.5)
MCHC RBC AUTO-ENTMCNC: 31.1 G/DL (ref 28.4–34.8)
MCV RBC AUTO: 94.7 FL (ref 82.6–102.9)
NRBC AUTOMATED: 0 PER 100 WBC
PDW BLD-RTO: 13.5 % (ref 11.8–14.4)
PLATELET # BLD: 314 K/UL (ref 138–453)
PMV BLD AUTO: 10.3 FL (ref 8.1–13.5)
POTASSIUM SERPL-SCNC: 5 MMOL/L (ref 3.7–5.3)
RBC # BLD: 4.34 M/UL (ref 3.95–5.11)
SODIUM BLD-SCNC: 143 MMOL/L (ref 135–144)
TOTAL PROTEIN: 7.2 G/DL (ref 6.4–8.3)
TRIGL SERPL-MCNC: 212 MG/DL
TSH SERPL DL<=0.05 MIU/L-ACNC: 0.52 MIU/L (ref 0.3–5)
WBC # BLD: 4.8 K/UL (ref 3.5–11.3)